# Patient Record
Sex: FEMALE | Race: WHITE | Employment: OTHER | URBAN - METROPOLITAN AREA
[De-identification: names, ages, dates, MRNs, and addresses within clinical notes are randomized per-mention and may not be internally consistent; named-entity substitution may affect disease eponyms.]

---

## 2019-10-08 ENCOUNTER — HOSPITAL ENCOUNTER (OUTPATIENT)
Dept: MAMMOGRAPHY | Age: 74
Discharge: HOME | End: 2019-10-08
Attending: INTERNAL MEDICINE
Payer: MEDICARE

## 2019-10-08 DIAGNOSIS — M81.0 SENILE OSTEOPOROSIS: ICD-10-CM

## 2019-10-08 PROCEDURE — 77080 DXA BONE DENSITY AXIAL: CPT

## 2019-11-15 ENCOUNTER — APPOINTMENT (OUTPATIENT)
Dept: GENERAL RADIOLOGY | Age: 74
End: 2019-11-15
Attending: INTERNAL MEDICINE
Payer: MEDICARE

## 2019-11-15 ENCOUNTER — ANESTHESIA EVENT (OUTPATIENT)
Dept: ENDOSCOPY | Age: 74
End: 2019-11-15
Payer: MEDICARE

## 2019-11-15 ENCOUNTER — HOSPITAL ENCOUNTER (OUTPATIENT)
Age: 74
Setting detail: OUTPATIENT SURGERY
Discharge: HOME | End: 2019-11-15
Attending: INTERNAL MEDICINE | Admitting: INTERNAL MEDICINE
Payer: MEDICARE

## 2019-11-15 ENCOUNTER — ANESTHESIA (OUTPATIENT)
Dept: ENDOSCOPY | Age: 74
End: 2019-11-15
Payer: MEDICARE

## 2019-11-15 VITALS
HEART RATE: 59 BPM | BODY MASS INDEX: 22.18 KG/M2 | TEMPERATURE: 97.6 F | SYSTOLIC BLOOD PRESSURE: 148 MMHG | WEIGHT: 138 LBS | RESPIRATION RATE: 16 BRPM | OXYGEN SATURATION: 97 % | HEIGHT: 66 IN | DIASTOLIC BLOOD PRESSURE: 86 MMHG

## 2019-11-15 PROCEDURE — 76040000007: Performed by: INTERNAL MEDICINE

## 2019-11-15 PROCEDURE — 74270 X-RAY XM COLON 1CNTRST STD: CPT

## 2019-11-15 PROCEDURE — 74011250636 HC RX REV CODE- 250/636: Performed by: INTERNAL MEDICINE

## 2019-11-15 PROCEDURE — 74011000250 HC RX REV CODE- 250: Performed by: NURSE ANESTHETIST, CERTIFIED REGISTERED

## 2019-11-15 PROCEDURE — 88305 TISSUE EXAM BY PATHOLOGIST: CPT

## 2019-11-15 PROCEDURE — 74011250636 HC RX REV CODE- 250/636: Performed by: NURSE ANESTHETIST, CERTIFIED REGISTERED

## 2019-11-15 PROCEDURE — 77030013992 HC SNR POLYP ENDOSC BSC -B: Performed by: INTERNAL MEDICINE

## 2019-11-15 PROCEDURE — 76060000032 HC ANESTHESIA 0.5 TO 1 HR: Performed by: INTERNAL MEDICINE

## 2019-11-15 RX ORDER — FLUMAZENIL 0.1 MG/ML
0.2 INJECTION INTRAVENOUS
Status: DISCONTINUED | OUTPATIENT
Start: 2019-11-15 | End: 2019-11-15 | Stop reason: HOSPADM

## 2019-11-15 RX ORDER — SODIUM CHLORIDE 9 MG/ML
75 INJECTION, SOLUTION INTRAVENOUS CONTINUOUS
Status: DISCONTINUED | OUTPATIENT
Start: 2019-11-15 | End: 2019-11-15 | Stop reason: HOSPADM

## 2019-11-15 RX ORDER — PROPOFOL 10 MG/ML
INJECTION, EMULSION INTRAVENOUS AS NEEDED
Status: DISCONTINUED | OUTPATIENT
Start: 2019-11-15 | End: 2019-11-15 | Stop reason: HOSPADM

## 2019-11-15 RX ORDER — LIDOCAINE HYDROCHLORIDE 20 MG/ML
INJECTION, SOLUTION EPIDURAL; INFILTRATION; INTRACAUDAL; PERINEURAL AS NEEDED
Status: DISCONTINUED | OUTPATIENT
Start: 2019-11-15 | End: 2019-11-15 | Stop reason: HOSPADM

## 2019-11-15 RX ORDER — MIDAZOLAM HYDROCHLORIDE 1 MG/ML
.25-5 INJECTION, SOLUTION INTRAMUSCULAR; INTRAVENOUS
Status: DISCONTINUED | OUTPATIENT
Start: 2019-11-15 | End: 2019-11-15 | Stop reason: HOSPADM

## 2019-11-15 RX ORDER — SIMVASTATIN 40 MG/1
40 TABLET, FILM COATED ORAL
COMMUNITY

## 2019-11-15 RX ORDER — GLYCOPYRROLATE 0.2 MG/ML
INJECTION INTRAMUSCULAR; INTRAVENOUS AS NEEDED
Status: DISCONTINUED | OUTPATIENT
Start: 2019-11-15 | End: 2019-11-15 | Stop reason: HOSPADM

## 2019-11-15 RX ORDER — SODIUM CHLORIDE 0.9 % (FLUSH) 0.9 %
5-40 SYRINGE (ML) INJECTION EVERY 8 HOURS
Status: DISCONTINUED | OUTPATIENT
Start: 2019-11-15 | End: 2019-11-15 | Stop reason: HOSPADM

## 2019-11-15 RX ORDER — NALOXONE HYDROCHLORIDE 0.4 MG/ML
0.4 INJECTION, SOLUTION INTRAMUSCULAR; INTRAVENOUS; SUBCUTANEOUS
Status: DISCONTINUED | OUTPATIENT
Start: 2019-11-15 | End: 2019-11-15 | Stop reason: HOSPADM

## 2019-11-15 RX ORDER — DIVALPROEX SODIUM 250 MG/1
250 TABLET, DELAYED RELEASE ORAL DAILY
COMMUNITY

## 2019-11-15 RX ORDER — SODIUM CHLORIDE 0.9 % (FLUSH) 0.9 %
5-40 SYRINGE (ML) INJECTION AS NEEDED
Status: DISCONTINUED | OUTPATIENT
Start: 2019-11-15 | End: 2019-11-15 | Stop reason: HOSPADM

## 2019-11-15 RX ORDER — EPINEPHRINE 0.1 MG/ML
1 INJECTION INTRACARDIAC; INTRAVENOUS
Status: DISCONTINUED | OUTPATIENT
Start: 2019-11-15 | End: 2019-11-15 | Stop reason: HOSPADM

## 2019-11-15 RX ORDER — ATROPINE SULFATE 0.1 MG/ML
0.5 INJECTION INTRAVENOUS
Status: DISCONTINUED | OUTPATIENT
Start: 2019-11-15 | End: 2019-11-15 | Stop reason: HOSPADM

## 2019-11-15 RX ORDER — DEXTROMETHORPHAN/PSEUDOEPHED 2.5-7.5/.8
1.2 DROPS ORAL
Status: DISCONTINUED | OUTPATIENT
Start: 2019-11-15 | End: 2019-11-15 | Stop reason: HOSPADM

## 2019-11-15 RX ADMIN — GLYCOPYRROLATE 0.2 MG: 0.2 INJECTION, SOLUTION INTRAMUSCULAR; INTRAVENOUS at 14:04

## 2019-11-15 RX ADMIN — PROPOFOL 20 MG: 10 INJECTION, EMULSION INTRAVENOUS at 14:02

## 2019-11-15 RX ADMIN — PROPOFOL 20 MG: 10 INJECTION, EMULSION INTRAVENOUS at 14:09

## 2019-11-15 RX ADMIN — PROPOFOL 80 MG: 10 INJECTION, EMULSION INTRAVENOUS at 14:00

## 2019-11-15 RX ADMIN — SODIUM CHLORIDE 75 ML/HR: 900 INJECTION, SOLUTION INTRAVENOUS at 13:52

## 2019-11-15 RX ADMIN — PROPOFOL 20 MG: 10 INJECTION, EMULSION INTRAVENOUS at 14:14

## 2019-11-15 RX ADMIN — PROPOFOL 20 MG: 10 INJECTION, EMULSION INTRAVENOUS at 14:07

## 2019-11-15 RX ADMIN — PROPOFOL 20 MG: 10 INJECTION, EMULSION INTRAVENOUS at 14:12

## 2019-11-15 RX ADMIN — PROPOFOL 20 MG: 10 INJECTION, EMULSION INTRAVENOUS at 14:04

## 2019-11-15 RX ADMIN — PROPOFOL 20 MG: 10 INJECTION, EMULSION INTRAVENOUS at 14:05

## 2019-11-15 RX ADMIN — LIDOCAINE HYDROCHLORIDE 50 MG: 20 INJECTION, SOLUTION EPIDURAL; INFILTRATION; INTRACAUDAL; PERINEURAL at 14:00

## 2019-11-15 NOTE — ANESTHESIA POSTPROCEDURE EVALUATION
Procedure(s):  COLONOSCOPY  ENDOSCOPIC POLYPECTOMY. MAC, total IV anesthesia    Anesthesia Post Evaluation        Patient location during evaluation: PACU  Note status: Adequate. Level of consciousness: responsive to verbal stimuli and sleepy but conscious  Pain management: satisfactory to patient  Airway patency: patent  Anesthetic complications: no  Cardiovascular status: acceptable  Respiratory status: acceptable  Hydration status: acceptable  Comments: +Post-Anesthesia Evaluation and Assessment    Patient: Daren Beyre MRN: [id number]  SSN: [id number]   YOB: 1945  Age: 66 y.o. Sex: female      Cardiovascular Function/Vital Signs    /86   Pulse (!) 59   Temp 36.4 °C (97.6 °F)   Resp 16   Ht 5' 6\" (1.676 m)   Wt 62.6 kg (138 lb)   SpO2 97%   Breastfeeding? No   BMI 22.27 kg/m²     Patient is status post Procedure(s):  COLONOSCOPY  ENDOSCOPIC POLYPECTOMY. Nausea/Vomiting: Controlled. Postoperative hydration reviewed and adequate. Pain:  Pain Scale 1: Numeric (0 - 10) (11/15/19 1604)  Pain Intensity 1: 0 (11/15/19 1604)   Managed. Neurological Status: At baseline. Mental Status and Level of Consciousness: Arousable. Pulmonary Status:   O2 Device: Room air (11/15/19 1604)   Adequate oxygenation and airway patent. Complications related to anesthesia: None    Post-anesthesia assessment completed. No concerns. Signed By: Singh Shay MD    11/15/2019  Post anesthesia nausea and vomiting:  controlled      Vitals Value Taken Time   /86 11/15/2019  4:04 PM   Temp 36.4 °C (97.6 °F) 11/15/2019  2:34 PM   Pulse 60 11/15/2019  2:41 PM   Resp 16 11/15/2019  4:04 PM   SpO2 96 % 11/15/2019  2:41 PM   Vitals shown include unvalidated device data.

## 2019-11-15 NOTE — ANESTHESIA PREPROCEDURE EVALUATION
Relevant Problems   No relevant active problems       Anesthetic History   No history of anesthetic complications            Review of Systems / Medical History  Patient summary reviewed, nursing notes reviewed and pertinent labs reviewed    Pulmonary  Within defined limits                 Neuro/Psych         Psychiatric history     Cardiovascular  Within defined limits                Exercise tolerance: >4 METS     GI/Hepatic/Renal  Within defined limits              Endo/Other        Arthritis     Other Findings   Comments: Screening     Smokes MJ  Drinks 14 wilmar/wk         Physical Exam    Airway  Mallampati: II  TM Distance: 4 - 6 cm  Neck ROM: normal range of motion   Mouth opening: Normal     Cardiovascular  Regular rate and rhythm,  S1 and S2 normal,  no murmur, click, rub, or gallop             Dental  No notable dental hx       Pulmonary  Breath sounds clear to auscultation               Abdominal  GI exam deferred       Other Findings            Anesthetic Plan    ASA: 2  Anesthesia type: MAC and total IV anesthesia          Induction: Intravenous  Anesthetic plan and risks discussed with: Patient

## 2019-11-15 NOTE — PERIOP NOTES
Polyp removed from Rectum:   - Protruding lesions:     -Sessile Polyp(s) size 2-3 mm removed by polypectomy (cold snare) using cold snare technique.

## 2019-11-15 NOTE — PROGRESS NOTES
[facility]  1945  [id number]    Situation:  Verbal report received from: Abel Pillai RN  Procedure: Procedure(s):  COLONOSCOPY  ENDOSCOPIC POLYPECTOMY    Background:    Preoperative diagnosis: SCREENING COLONOSCOPY  Postoperative diagnosis: colon polyp; Cololnoscopy to hepatic flexure-incomplete    :  Dr. Timbo Burgess  Assistant(s): Endoscopy Technician-1: Matt Dean  Endoscopy RN-1: Khadra Núñez    Specimens:   ID Type Source Tests Collected by Time Destination   1 : recto-sigmoid colon polyp for pathology Preservative Rectum  Willie Shetty MD 11/15/2019 1419 Pathology     H. Pylori  no    Assessment:  Intra-procedure medications   Anesthesia gave intra-procedure sedation and medications, see anesthesia flow sheet yes    Intravenous fluids: NS@ KVO     Vital signs stable      Abdominal assessment: round and soft      Recommendation:  Discharge patient per MD order .   Family or Friend    Permission to share finding with family or friend yes

## 2019-11-15 NOTE — H&P
Pre-endoscopy H and P    The patient was seen and examined in the room/pre-op holding area. The airway was assessed and documented. The problem list, past medical history, and medications were reviewed. There is no problem list on file for this patient. Social History     Socioeconomic History    Marital status:      Spouse name: Not on file    Number of children: Not on file    Years of education: Not on file    Highest education level: Not on file   Occupational History    Not on file   Social Needs    Financial resource strain: Not on file    Food insecurity:     Worry: Not on file     Inability: Not on file    Transportation needs:     Medical: Not on file     Non-medical: Not on file   Tobacco Use    Smoking status: Not on file   Substance and Sexual Activity    Alcohol use: Not on file    Drug use: Not on file    Sexual activity: Not on file   Lifestyle    Physical activity:     Days per week: Not on file     Minutes per session: Not on file    Stress: Not on file   Relationships    Social connections:     Talks on phone: Not on file     Gets together: Not on file     Attends Baptism service: Not on file     Active member of club or organization: Not on file     Attends meetings of clubs or organizations: Not on file     Relationship status: Not on file    Intimate partner violence:     Fear of current or ex partner: Not on file     Emotionally abused: Not on file     Physically abused: Not on file     Forced sexual activity: Not on file   Other Topics Concern    Not on file   Social History Narrative    Not on file     No past medical history on file. None           The review of systems is:  Negative  for shortness of breath or chest pain      The heart, lungs, and mental status were satisfactory for the administration of deep sedation and for the procedure. I discussed with the patient the objectives, risks, consequences and alternatives to the procedure. Maria Esther Corrigan MD  11/15/2019  1:42 PM

## 2019-11-15 NOTE — DISCHARGE INSTRUCTIONS
[place] Office: [phone]    Tara Suazo  [id number]  1945    EGD/COLONOSCOPY DISCHARGE INSTRUCTIONS  Discomfort:  redness at IV site- apply warm compress to area; if redness or soreness persist- contact your physician  Gaseous discomfort- walking, belching will help relieve any discomfort  You may not operate a vehicle for 12 hours  You may not engage in an occupation involving machinery or appliances for rest of today. You may not drink alcoholic beverages for at least 12 hours  Avoid making any critical decisions for at least 24 hour  DIET  You may resume your regular diet - however -  remember your colon is empty and a heavy meal will produce gas. Avoid these foods:  fried / greasy foods, excessive carbonated drinks or too much caffeine  MEDICATIONS   Regarding Aspirin or Nonsteroidal medications specifically, please see below. ACTIVITY  You may resume your normal daily activities. Spend the remainder of the day resting -  avoid any strenuous activity. CALL M.D. ANY SIGN OF   Increasing pain, nausea, vomiting  Abdominal distension (swelling)  New increased bleeding (oral or rectal)  Fever (chills)    You may not take any Advil, Aspirin, Ibuprofen, Motrin, Aleve, or Goodys for 7 days, ONLY  Tylenol as needed for pain. Follow-up Instructions:   Call  Rob Barrientos MD for any questions or concerns  Results of procedure / biopsy in 7 days   Telephone # [phone]      Follow-up Information    None

## 2019-11-15 NOTE — PROCEDURES
Colonoscopy Procedure Note    Abhinav Olivera  1945  [id number]    Indications:  Please see below. Pre-operative Diagnosis: personal hx of colon polyps    Post-operative Diagnosis: colon polyp, internal hemorrhoids, adhesions      : Rob Cornelius MD    Referring Provider: Iva Jara MD    Sedation:  MAC anesthesia Propofol        Procedure Details:    After detailed informed consent was obtained with all risks and benefits of procedure explained and preoperative exam completed, the patient was taken to the endoscopy suite and placed in the left lateral decubitus position. Upon sequential sedation as per above, a digital rectal exam was performed  And was normal.  The Olympus videocolonoscope  was inserted in the rectum and carefully advanced to the hepatic flexure. The quality of preparation was good. The colonoscope was slowly withdrawn with careful evaluation between folds. Retroflexion in the rectum was performed. Findings:   · A single  sessile 1 cm polyp was noted in the rectosigmoid colon removed with a cold snare. · The scope keeps coiling in her pelvic area causing a large bulge suggesting of weak wall/ adhesions. She has a large horizontal abdominal scar. Counter abdominal pressure and placing on her back can get the pediatric scope to the hepatic flexure only. There is a risk of perforating her colon. No further advancement performed. · Small internal hemorrhoids are noted. Therapies:  1 complete polypectomy was performed using cold snare  and the polyp was retrieved    Specimen: Specimens were collected as described above and sent to pathology. Complications: None were encountered during the procedure. EBL:  None. Recommendations:     -Await pathology. -AC barium enema today. -Forfuture consider CT colonography. Rob Cornelius MD  11/15/2019  2:20 PM

## 2022-03-10 ENCOUNTER — TRANSCRIBE ORDER (OUTPATIENT)
Dept: SCHEDULING | Age: 77
End: 2022-03-10

## 2022-03-10 DIAGNOSIS — M81.0 OSTEOPOROSIS: Primary | ICD-10-CM

## 2023-05-17 RX ORDER — DIVALPROEX SODIUM 250 MG/1
250 TABLET, DELAYED RELEASE ORAL DAILY
COMMUNITY

## 2023-05-17 RX ORDER — SIMVASTATIN 40 MG
40 TABLET ORAL NIGHTLY
COMMUNITY

## 2023-06-14 ENCOUNTER — HOME HEALTH ADMISSION (OUTPATIENT)
Dept: HOME HEALTH SERVICES | Facility: HOME HEALTH | Age: 78
End: 2023-06-14

## (undated) DEVICE — CONTAINER SPEC 20 ML LID NEUT BUFF FORMALIN 10 % POLYPR STS

## (undated) DEVICE — Device

## (undated) DEVICE — BASIN EMSIS 16OZ GRAPHITE PLAS KID SHP MOLD GRAD FOR ORAL

## (undated) DEVICE — NEONATAL-ADULT SPO2 SENSOR: Brand: NELLCOR

## (undated) DEVICE — CATH IV AUTOGRD BC BLU 22GA 25 -- INSYTE

## (undated) DEVICE — SYR 10ML LUER LOK 1/5ML GRAD --

## (undated) DEVICE — TRAP SUC MUCOUS 70ML -- MEDICHOICE MEDLINE

## (undated) DEVICE — STRAINER URIN CALC RNL MSH -- CONVERT TO ITEM 357634

## (undated) DEVICE — SYR 3ML LL TIP 1/10ML GRAD --

## (undated) DEVICE — SET ADMIN 16ML TBNG L100IN 2 Y INJ SITE IV PIGGY BK DISP

## (undated) DEVICE — NEEDLE HYPO 18GA L1.5IN PNK S STL HUB POLYPR SHLD REG BVL

## (undated) DEVICE — TOWEL 4 PLY TISS 19X30 SUE WHT

## (undated) DEVICE — SNARE ENDOSCP M L240CM W27MM SHTH DIA2.4MM CHN 2.8MM OVL

## (undated) DEVICE — Z DISCONTINUED PER MEDLINE LINE GAS SAMPLING O2/CO2 LNG AD 13 FT NSL W/ TBNG FILTERLINE

## (undated) DEVICE — ELECTRODE,RADIOTRANSLUCENT,FOAM,5PK: Brand: MEDLINE

## (undated) DEVICE — SOLIDIFIER MEDC 1200ML -- CONVERT TO 356117

## (undated) DEVICE — 1200 GUARD II KIT W/5MM TUBE W/O VAC TUBE: Brand: GUARDIAN